# Patient Record
Sex: FEMALE | Race: WHITE | NOT HISPANIC OR LATINO | Employment: FULL TIME | URBAN - NONMETROPOLITAN AREA
[De-identification: names, ages, dates, MRNs, and addresses within clinical notes are randomized per-mention and may not be internally consistent; named-entity substitution may affect disease eponyms.]

---

## 2017-02-02 ENCOUNTER — OFFICE VISIT (OUTPATIENT)
Dept: OTOLARYNGOLOGY | Facility: CLINIC | Age: 32
End: 2017-02-02

## 2017-02-02 DIAGNOSIS — Z53.21 PROCEDURE AND TREATMENT NOT CARRIED OUT DUE TO PATIENT LEAVING PRIOR TO BEING SEEN BY HEALTH CARE PROVIDER: Primary | ICD-10-CM

## 2017-02-02 PROCEDURE — PTNOCHG PR CUSTOM PT NO CHARGE VISIT: Performed by: OTOLARYNGOLOGY

## 2017-02-02 NOTE — PROGRESS NOTES
Patient registered at the  but then left before being called back to her room and was not seen today.

## 2017-05-04 ENCOUNTER — OFFICE VISIT (OUTPATIENT)
Dept: OTOLARYNGOLOGY | Facility: CLINIC | Age: 32
End: 2017-05-04

## 2017-05-04 VITALS — HEART RATE: 94 BPM | WEIGHT: 242 LBS | HEIGHT: 64 IN | BODY MASS INDEX: 41.32 KG/M2

## 2017-05-04 DIAGNOSIS — Z48.813 AFTERCARE FOLLOWING SURGERY OF THE RESPIRATORY SYSTEM: Primary | ICD-10-CM

## 2017-05-04 PROCEDURE — 31231 NASAL ENDOSCOPY DX: CPT | Performed by: OTOLARYNGOLOGY

## 2017-05-04 PROCEDURE — 99213 OFFICE O/P EST LOW 20 MIN: CPT | Performed by: OTOLARYNGOLOGY

## 2017-05-04 RX ORDER — LORATADINE 10 MG/1
10 TABLET ORAL DAILY
COMMUNITY
End: 2021-08-25 | Stop reason: HOSPADM

## 2021-08-25 ENCOUNTER — LAB (OUTPATIENT)
Dept: LAB | Facility: HOSPITAL | Age: 36
End: 2021-08-25

## 2021-08-25 ENCOUNTER — OFFICE VISIT (OUTPATIENT)
Dept: OBSTETRICS AND GYNECOLOGY | Facility: CLINIC | Age: 36
End: 2021-08-25

## 2021-08-25 VITALS
HEIGHT: 64 IN | WEIGHT: 241 LBS | SYSTOLIC BLOOD PRESSURE: 110 MMHG | BODY MASS INDEX: 41.15 KG/M2 | DIASTOLIC BLOOD PRESSURE: 66 MMHG

## 2021-08-25 DIAGNOSIS — Z12.4 ENCOUNTER FOR PAPANICOLAOU SMEAR FOR CERVICAL CANCER SCREENING: ICD-10-CM

## 2021-08-25 DIAGNOSIS — Z01.419 WELL WOMAN EXAM WITH ROUTINE GYNECOLOGICAL EXAM: Primary | ICD-10-CM

## 2021-08-25 DIAGNOSIS — Z11.3 SCREENING FOR STD (SEXUALLY TRANSMITTED DISEASE): ICD-10-CM

## 2021-08-25 LAB
ALBUMIN SERPL-MCNC: 4.6 G/DL (ref 3.5–5.2)
ALBUMIN/GLOB SERPL: 1.6 G/DL
ALP SERPL-CCNC: 63 U/L (ref 39–117)
ALT SERPL W P-5'-P-CCNC: 11 U/L (ref 1–33)
ANION GAP SERPL CALCULATED.3IONS-SCNC: 9.1 MMOL/L (ref 5–15)
AST SERPL-CCNC: 16 U/L (ref 1–32)
BASOPHILS # BLD AUTO: 0.09 10*3/MM3 (ref 0–0.2)
BASOPHILS NFR BLD AUTO: 0.9 % (ref 0–1.5)
BILIRUB SERPL-MCNC: 0.4 MG/DL (ref 0–1.2)
BUN SERPL-MCNC: 11 MG/DL (ref 6–20)
BUN/CREAT SERPL: 17.2 (ref 7–25)
CALCIUM SPEC-SCNC: 9 MG/DL (ref 8.6–10.5)
CHLORIDE SERPL-SCNC: 102 MMOL/L (ref 98–107)
CHOLEST SERPL-MCNC: 198 MG/DL (ref 0–200)
CO2 SERPL-SCNC: 23.9 MMOL/L (ref 22–29)
CREAT SERPL-MCNC: 0.64 MG/DL (ref 0.57–1)
DEPRECATED RDW RBC AUTO: 41.5 FL (ref 37–54)
EOSINOPHIL # BLD AUTO: 1.39 10*3/MM3 (ref 0–0.4)
EOSINOPHIL NFR BLD AUTO: 13.2 % (ref 0.3–6.2)
ERYTHROCYTE [DISTWIDTH] IN BLOOD BY AUTOMATED COUNT: 12.9 % (ref 12.3–15.4)
GFR SERPL CREATININE-BSD FRML MDRD: 104 ML/MIN/1.73
GLOBULIN UR ELPH-MCNC: 2.8 GM/DL
GLUCOSE SERPL-MCNC: 86 MG/DL (ref 65–99)
HBA1C MFR BLD: 5.41 % (ref 4.8–5.6)
HBV SURFACE AG SERPL QL IA: NORMAL
HCT VFR BLD AUTO: 42.6 % (ref 34–46.6)
HCV AB SER DONR QL: NORMAL
HDLC SERPL-MCNC: 44 MG/DL (ref 40–60)
HGB BLD-MCNC: 13.7 G/DL (ref 12–15.9)
HIV1+2 AB SER QL: NORMAL
IMM GRANULOCYTES # BLD AUTO: 0.04 10*3/MM3 (ref 0–0.05)
IMM GRANULOCYTES NFR BLD AUTO: 0.4 % (ref 0–0.5)
LDLC SERPL CALC-MCNC: 135 MG/DL (ref 0–100)
LDLC/HDLC SERPL: 3.01 {RATIO}
LYMPHOCYTES # BLD AUTO: 1.98 10*3/MM3 (ref 0.7–3.1)
LYMPHOCYTES NFR BLD AUTO: 18.8 % (ref 19.6–45.3)
MCH RBC QN AUTO: 28.3 PG (ref 26.6–33)
MCHC RBC AUTO-ENTMCNC: 32.2 G/DL (ref 31.5–35.7)
MCV RBC AUTO: 88 FL (ref 79–97)
MONOCYTES # BLD AUTO: 0.52 10*3/MM3 (ref 0.1–0.9)
MONOCYTES NFR BLD AUTO: 4.9 % (ref 5–12)
NEUTROPHILS NFR BLD AUTO: 6.52 10*3/MM3 (ref 1.7–7)
NEUTROPHILS NFR BLD AUTO: 61.8 % (ref 42.7–76)
NRBC BLD AUTO-RTO: 0 /100 WBC (ref 0–0.2)
PLATELET # BLD AUTO: 315 10*3/MM3 (ref 140–450)
PMV BLD AUTO: 10 FL (ref 6–12)
POTASSIUM SERPL-SCNC: 4.2 MMOL/L (ref 3.5–5.2)
PROT SERPL-MCNC: 7.4 G/DL (ref 6–8.5)
RBC # BLD AUTO: 4.84 10*6/MM3 (ref 3.77–5.28)
SODIUM SERPL-SCNC: 135 MMOL/L (ref 136–145)
TRIGL SERPL-MCNC: 107 MG/DL (ref 0–150)
TSH SERPL DL<=0.05 MIU/L-ACNC: 2.01 UIU/ML (ref 0.27–4.2)
VLDLC SERPL-MCNC: 19 MG/DL (ref 5–40)
WBC # BLD AUTO: 10.54 10*3/MM3 (ref 3.4–10.8)

## 2021-08-25 PROCEDURE — 87661 TRICHOMONAS VAGINALIS AMPLIF: CPT | Performed by: NURSE PRACTITIONER

## 2021-08-25 PROCEDURE — 86592 SYPHILIS TEST NON-TREP QUAL: CPT | Performed by: NURSE PRACTITIONER

## 2021-08-25 PROCEDURE — 87591 N.GONORRHOEAE DNA AMP PROB: CPT | Performed by: NURSE PRACTITIONER

## 2021-08-25 PROCEDURE — 99385 PREV VISIT NEW AGE 18-39: CPT | Performed by: NURSE PRACTITIONER

## 2021-08-25 PROCEDURE — 87340 HEPATITIS B SURFACE AG IA: CPT | Performed by: NURSE PRACTITIONER

## 2021-08-25 PROCEDURE — 80061 LIPID PANEL: CPT | Performed by: NURSE PRACTITIONER

## 2021-08-25 PROCEDURE — 83036 HEMOGLOBIN GLYCOSYLATED A1C: CPT | Performed by: NURSE PRACTITIONER

## 2021-08-25 PROCEDURE — 36415 COLL VENOUS BLD VENIPUNCTURE: CPT | Performed by: NURSE PRACTITIONER

## 2021-08-25 PROCEDURE — 84443 ASSAY THYROID STIM HORMONE: CPT | Performed by: NURSE PRACTITIONER

## 2021-08-25 PROCEDURE — G0432 EIA HIV-1/HIV-2 SCREEN: HCPCS | Performed by: NURSE PRACTITIONER

## 2021-08-25 PROCEDURE — 87491 CHLMYD TRACH DNA AMP PROBE: CPT | Performed by: NURSE PRACTITIONER

## 2021-08-25 PROCEDURE — 86803 HEPATITIS C AB TEST: CPT | Performed by: NURSE PRACTITIONER

## 2021-08-25 PROCEDURE — 85025 COMPLETE CBC W/AUTO DIFF WBC: CPT | Performed by: NURSE PRACTITIONER

## 2021-08-25 PROCEDURE — 80053 COMPREHEN METABOLIC PANEL: CPT | Performed by: NURSE PRACTITIONER

## 2021-08-25 RX ORDER — ALBUTEROL SULFATE 90 UG/1
2 AEROSOL, METERED RESPIRATORY (INHALATION)
COMMUNITY
Start: 2021-04-22

## 2021-08-25 NOTE — PROGRESS NOTES
Subjective   Olivia Mccain is a 36 y.o. here for gyn annual    Olivia Mccain is a 36 yr old  who presents today for her gyn annual. Last pap 2017 NIL. Hx of tubal ligation. LMP 8/3/2021. Periods are occur monthly, no issues. She is ; ex- had cheated. Denies recent STI screening. Work as a . Currently, doing [employer] and has lost 20lbs since February. Does not have a PCP; is wondering about checking her cholesterol.       The following portions of the patient's history were reviewed and updated as appropriate: allergies, current medications, past family history, past medical history, past social history, past surgical history and problem list.    Review of Systems   Constitutional: Negative for chills, fatigue, fever, unexpected weight gain and unexpected weight loss.   HENT: Negative for sneezing and sore throat.    Respiratory: Negative for shortness of breath.    Cardiovascular: Negative for chest pain and palpitations.   Gastrointestinal: Negative for abdominal pain, constipation, diarrhea and nausea.   Endocrine: Negative for cold intolerance and heat intolerance.   Genitourinary: Negative for amenorrhea, breast discharge, breast lump, breast pain, difficulty urinating, dysuria, frequency, menstrual problem, pelvic pain, pelvic pressure, urinary incontinence, vaginal bleeding, vaginal discharge and vaginal pain.   Skin: Negative for rash.   Neurological: Negative for weakness and headache.   Psychiatric/Behavioral: Negative for sleep disturbance, depressed mood and stress.       Objective   Physical Exam  Vitals and nursing note reviewed. Exam conducted with a chaperone present.   Constitutional:       Appearance: She is well-developed. She is obese.      Comments: BMI 41.    HENT:      Head: Normocephalic and atraumatic.   Eyes:      Conjunctiva/sclera: Conjunctivae normal.   Neck:      Thyroid: No thyromegaly.   Cardiovascular:      Rate and Rhythm: Normal rate  and regular rhythm.      Heart sounds: Normal heart sounds.   Pulmonary:      Effort: Pulmonary effort is normal. No respiratory distress.      Breath sounds: Normal breath sounds.   Chest:      Breasts:         Right: Normal.         Left: Normal.   Abdominal:      General: Bowel sounds are normal. There is no distension.      Palpations: Abdomen is soft.      Tenderness: There is no abdominal tenderness.   Genitourinary:     General: Normal vulva.      Labia:         Right: No rash, tenderness, lesion or injury.         Left: No rash, tenderness, lesion or injury.       Vagina: Normal.      Cervix: Normal.      Uterus: Normal.       Adnexa: Right adnexa normal and left adnexa normal.      Rectum: Normal.      Comments: Pap cotesting and G/C collected.   Musculoskeletal:         General: Normal range of motion.      Cervical back: Normal range of motion and neck supple.   Skin:     General: Skin is warm and dry.   Neurological:      Mental Status: She is alert and oriented to person, place, and time.   Psychiatric:         Behavior: Behavior normal.         Assessment/Plan   Diagnoses and all orders for this visit:    1. Well woman exam with routine gynecological exam (Primary)  -     CBC Auto Differential  -     Comprehensive Metabolic Panel  -     Lipid Panel  -     Hemoglobin A1c  -     TSH  -     Extra Tubes    2. Encounter for Papanicolaou smear for cervical cancer screening  -     Liquid-based Pap Smear, Screening  -     Extra Tubes    3. Screening for STD (sexually transmitted disease)  -     Hepatitis B Surface Antigen  -     Hepatitis C Antibody  -     HIV-1 & HIV-2 Antibodies  -     RPR  -     Cancel: Chlamydia trachomatis, Neisseria gonorrhoeae, Trichomonas vaginalis, PCR - Swab, Cervix  -     Chlamydia trachomatis, Neisseria gonorrhoeae, Trichomonas vaginalis, PCR - Swab, Cervix  -     Extra Tubes         Reviewed pap smear screening guidelines and recommendation to do STI screening. Discussed breast  awareness and annual mammogram starting at age 40. Encourage to establish care with PCP; recommended Dr. Dela Cruz.  Provided encouragement with continued weight loss journey. Return in one year or as needed.

## 2021-08-26 ENCOUNTER — TELEPHONE (OUTPATIENT)
Dept: OBSTETRICS AND GYNECOLOGY | Facility: CLINIC | Age: 36
End: 2021-08-26

## 2021-08-26 LAB
C TRACH RRNA CVX QL NAA+PROBE: NEGATIVE
N GONORRHOEA RRNA SPEC QL NAA+PROBE: NEGATIVE
RPR SER QL: NORMAL
TRICHOMONAS VAGINALIS PCR: NEGATIVE

## 2021-08-30 LAB
LAB AP CASE REPORT: NORMAL
PATH INTERP SPEC-IMP: NORMAL

## 2021-08-31 ENCOUNTER — TELEPHONE (OUTPATIENT)
Dept: OBSTETRICS AND GYNECOLOGY | Facility: CLINIC | Age: 36
End: 2021-08-31

## 2021-08-31 NOTE — TELEPHONE ENCOUNTER
"----- Message from SHEILA Joshi sent at 8/30/2021  4:12 PM CDT -----  Nomal pap, negative HPV. Repeat in 5 years Please send letter. Can you also call her and let her know that her CBC is normal, she is not prediabetic, STI screening was negative, and she her \"bad\" cholesterol-LDL is slightly high-recommend watching diet.   "

## 2022-11-09 ENCOUNTER — OFFICE VISIT (OUTPATIENT)
Dept: OBSTETRICS AND GYNECOLOGY | Facility: CLINIC | Age: 37
End: 2022-11-09

## 2022-11-09 VITALS
DIASTOLIC BLOOD PRESSURE: 74 MMHG | BODY MASS INDEX: 42.34 KG/M2 | WEIGHT: 248 LBS | SYSTOLIC BLOOD PRESSURE: 122 MMHG | HEIGHT: 64 IN

## 2022-11-09 DIAGNOSIS — Z01.419 ENCOUNTER FOR GYNECOLOGICAL EXAMINATION WITHOUT ABNORMAL FINDING: Primary | ICD-10-CM

## 2022-11-09 PROCEDURE — 99395 PREV VISIT EST AGE 18-39: CPT | Performed by: NURSE PRACTITIONER

## 2022-11-09 RX ORDER — CETIRIZINE HYDROCHLORIDE 10 MG/1
TABLET ORAL
COMMUNITY
Start: 2022-09-01

## 2022-11-09 NOTE — PROGRESS NOTES
Subjective   Olivia Mccain is a 37 y.o. Well women's exam    History of Present Illness  Pap:  neg, hpv neg      Patient presents today for an annual women's exam. She is not having any complaints. She does have a concern about a spot on her back. Her father recently  of melanoma.       Gynecologic Exam  The patient's pertinent negatives include no genital itching, genital lesions, genital odor, genital rash, missed menses, pelvic pain, vaginal bleeding or vaginal discharge. The patient is experiencing no pain. She is not pregnant. Pertinent negatives include no abdominal pain, chills, constipation, diarrhea, dysuria, fever, flank pain, frequency, headaches, hematuria, nausea or urgency. She uses nothing for contraception. Her menstrual history has been regular.       The following portions of the patient's history were reviewed and updated as appropriate: allergies, current medications, past family history, past medical history, past social history, past surgical history and problem list.    Review of Systems   Constitutional: Negative for appetite change, chills, fatigue and fever.   Respiratory: Negative for apnea, cough, choking, chest tightness, shortness of breath, wheezing and stridor.    Cardiovascular: Negative for chest pain, palpitations and leg swelling.   Gastrointestinal: Negative for abdominal pain, constipation, diarrhea and nausea.   Genitourinary: Negative for amenorrhea, breast discharge, breast lump, breast pain, decreased libido, decreased urine volume, difficulty urinating, dyspareunia, dysuria, flank pain, frequency, genital sores, hematuria, menstrual problem, missed menses, pelvic pain, pelvic pressure, urgency, urinary incontinence, vaginal bleeding, vaginal discharge and vaginal pain.       Objective   Physical Exam  Vitals reviewed. Exam conducted with a chaperone present.   Constitutional:       General: She is awake. She is not in acute distress.     Appearance:  Normal appearance. She is well-developed and normal weight. She is not ill-appearing, toxic-appearing or diaphoretic.   Cardiovascular:      Rate and Rhythm: Normal rate and regular rhythm.      Pulses: Normal pulses.      Heart sounds: Normal heart sounds.   Pulmonary:      Effort: Pulmonary effort is normal.      Breath sounds: Normal breath sounds.   Chest:   Breasts:     Juan Score is 5.      Breasts are symmetrical.      Right: Normal.      Left: Normal.      Comments: Bilateral dense fibrocystic like tissue.   Abdominal:      General: Bowel sounds are normal.      Hernia: There is no hernia in the left inguinal area or right inguinal area.   Genitourinary:     General: Normal vulva.      Exam position: Lithotomy position.      Pubic Area: No rash or pubic lice.       Juan stage (genital): 5.      Labia:         Right: No rash, tenderness, lesion or injury.         Left: No rash, tenderness, lesion or injury.       Urethra: No prolapse, urethral pain, urethral swelling or urethral lesion.      Vagina: Normal.      Cervix: Normal.          Comments: Uterus and adnexa non-palpable   Lymphadenopathy:      Upper Body:      Right upper body: No supraclavicular, axillary or pectoral adenopathy.      Left upper body: No supraclavicular, axillary or pectoral adenopathy.      Lower Body: No right inguinal adenopathy. No left inguinal adenopathy.   Skin:     General: Skin is warm and dry.          Neurological:      Mental Status: She is alert and easily aroused.   Psychiatric:         Behavior: Behavior is cooperative.           Assessment & Plan   Diagnoses and all orders for this visit:    1. Encounter for gynecological examination without abnormal finding (Primary)    Next pap due 2025. Mammograms start at 40 years old if no issues. Encouraged her to reach out to a dermatologist for a full skin work up with her fathers history of melanoma.       Scribed for SHEILA Orozco by SHEILA Cardona. 11/9/2022   09:58 CST    I attest and reviewed note documented per SHEILA Cardona.